# Patient Record
Sex: MALE | Race: OTHER | Employment: FULL TIME | URBAN - METROPOLITAN AREA
[De-identification: names, ages, dates, MRNs, and addresses within clinical notes are randomized per-mention and may not be internally consistent; named-entity substitution may affect disease eponyms.]

---

## 2023-11-21 ENCOUNTER — HOSPITAL ENCOUNTER (EMERGENCY)
Facility: HOSPITAL | Age: 28
Discharge: HOME | End: 2023-11-21
Attending: EMERGENCY MEDICINE
Payer: COMMERCIAL

## 2023-11-21 VITALS
HEART RATE: 62 BPM | SYSTOLIC BLOOD PRESSURE: 126 MMHG | TEMPERATURE: 97 F | HEIGHT: 65 IN | DIASTOLIC BLOOD PRESSURE: 69 MMHG | BODY MASS INDEX: 28.32 KG/M2 | RESPIRATION RATE: 18 BRPM | WEIGHT: 170 LBS | OXYGEN SATURATION: 99 %

## 2023-11-21 DIAGNOSIS — S86.012A ACHILLES RUPTURE, LEFT, INITIAL ENCOUNTER: Primary | ICD-10-CM

## 2023-11-21 PROCEDURE — 99283 EMERGENCY DEPT VISIT LOW MDM: CPT

## 2023-11-21 PROCEDURE — 99284 EMERGENCY DEPT VISIT MOD MDM: CPT

## 2023-11-21 RX ORDER — ACETAMINOPHEN AND CODEINE PHOSPHATE 300; 30 MG/1; MG/1
1 TABLET ORAL EVERY 6 HOURS PRN
Qty: 15 TABLET | Refills: 0 | Status: SHIPPED | OUTPATIENT
Start: 2023-11-21 | End: 2023-11-26

## 2023-11-22 NOTE — ED INITIAL ASSESSMENT (HPI)
Patient reports hearing \"pop\" to left ankle while playing soccer 30 minutes PTA. Patient reports pressure, denies numbness or tingling.

## (undated) NOTE — LETTER
Date & Time: 11/21/2023, 9:37 PM  Patient: Mariela Alvarado  Encounter Provider(s):    Delores Dobbs MD       To Whom It May Concern:    Mariela Alvarado was seen and treated in our department on 11/21/2023. He is restricted to crutch walking only for next 4 weeks.     If you have any questions or concerns, please do not hesitate to call.        _____________________________  Physician/APC Signature

## (undated) NOTE — LETTER
Date & Time: 11/21/2023, 10:59 PM  Patient: Julia Brambila [facility]  Encounter Provider(s):    Chrissy Katz MD       To Whom It May Concern:    Ginette Cardona was seen and treated in our department on 11/21/2023. He is excused from work for next 5 days.     If you have any questions or concerns, please do not hesitate to call.        _____________________________  Physician/APC Signature

## (undated) NOTE — LETTER
Date & Time: 11/21/2023, 10:00 PM  Patient: Baljit Miller  Encounter Provider(s):    Diaz Telles MD       To Whom It May Concern:    Baljit Miller was seen and treated in our department on 11/21/2023. He is excused from work for next 5 days.     If you have any questions or concerns, please do not hesitate to call.        _____________________________  Physician/[facility] Signature

## (undated) NOTE — LETTER
Date & Time: 11/21/2023, 10:58 PM  Patient: Stefano [facility]  Encounter Provider(s):    Elizabeth Carvajal MD       To Whom It May Concern:    Von Rueda was seen and treated in our department on 11/21/2023. He is restricted to crutch walking only for next 4 weeks.     If you have any questions or concerns, please do not hesitate to call.        _____________________________  Physician/APC Signature